# Patient Record
Sex: FEMALE | Race: OTHER
[De-identification: names, ages, dates, MRNs, and addresses within clinical notes are randomized per-mention and may not be internally consistent; named-entity substitution may affect disease eponyms.]

---

## 2019-03-10 ENCOUNTER — HOSPITAL ENCOUNTER (EMERGENCY)
Dept: HOSPITAL 41 - JD.ED | Age: 54
Discharge: HOME | End: 2019-03-10
Payer: COMMERCIAL

## 2019-03-10 VITALS — SYSTOLIC BLOOD PRESSURE: 151 MMHG | DIASTOLIC BLOOD PRESSURE: 96 MMHG

## 2019-03-10 DIAGNOSIS — R51: ICD-10-CM

## 2019-03-10 DIAGNOSIS — Z79.899: ICD-10-CM

## 2019-03-10 DIAGNOSIS — Z87.891: ICD-10-CM

## 2019-03-10 DIAGNOSIS — Z88.5: ICD-10-CM

## 2019-03-10 DIAGNOSIS — R07.9: Primary | ICD-10-CM

## 2019-03-10 DIAGNOSIS — I10: ICD-10-CM

## 2019-03-10 DIAGNOSIS — N39.0: ICD-10-CM

## 2019-03-10 PROCEDURE — 36415 COLL VENOUS BLD VENIPUNCTURE: CPT

## 2019-03-10 PROCEDURE — 84484 ASSAY OF TROPONIN QUANT: CPT

## 2019-03-10 PROCEDURE — 99284 EMERGENCY DEPT VISIT MOD MDM: CPT

## 2019-03-10 PROCEDURE — 81001 URINALYSIS AUTO W/SCOPE: CPT

## 2019-03-10 PROCEDURE — 93005 ELECTROCARDIOGRAM TRACING: CPT

## 2019-03-10 PROCEDURE — 85025 COMPLETE CBC W/AUTO DIFF WBC: CPT

## 2019-03-10 PROCEDURE — 80053 COMPREHEN METABOLIC PANEL: CPT

## 2019-03-10 PROCEDURE — 70450 CT HEAD/BRAIN W/O DYE: CPT

## 2019-03-10 PROCEDURE — 71045 X-RAY EXAM CHEST 1 VIEW: CPT

## 2019-03-10 NOTE — EDM.PDOC
ED HPI GENERAL MEDICAL PROBLEM





- General


Chief Complaint: Cardiovascular Problem


Stated Complaint: HIGH BLOOD PRESSURE


Time Seen by Provider: 03/10/19 12:49


Source of Information: Reports: Patient


History Limitations: Reports: No Limitations





- History of Present Illness


INITIAL COMMENTS - FREE TEXT/NARRATIVE: 





The patient presents with elevated blood pressure.  She said it was 240/150 at 

home.  She has a headache to the back of her head.  She is shaky and jittery.  

She has no double vision or blurred vision.  She has no fever, chills, cough, 

congestion and runny nose.  She does have some chest pain like a pressure.  She 

has no shortness of breath.  She has no nausea, vomiting or abdominal pain.  

She just does not feel right.  She was in [place] at [facility] for a 

consultation with a surgeon for weight loss surgery.  They said her blood 

pressure was high and she should follow up with that.  She has never had 

trouble with BP before.  She feels like she does have a UTI.


Onset: Gradual


Duration: Day(s):


Location: Reports: Head, Chest


Quality: Reports: Pressure


Severity: Moderate


Improves with: Reports: None


Worsens with: Reports: None


Associated Symptoms: Reports: Chest Pain, Headaches.  Denies: Cough, Fever/

Chills, Nausea/Vomiting, Shortness of Breath


  ** Posterior Head


Pain Score (Numeric/FACES): 6





- Related Data


 Allergies











Allergy/AdvReac Type Severity Reaction Status Date / Time


 


tramadol Allergy  Nausea and Verified 03/10/19 12:55





   Vomiting  











Home Meds: 


 Home Meds





Cephalexin [Keflex] 500 mg PO BID #10 capsule 03/10/19 [Rx]


Cyanocobalamin (Vitamin B12) [Vitamin B12] 1,000 mcg PO DAILY 03/10/19 [History]


FA/Lycopene/Lut/MV,Ca,Iron,Min [Centrum] 1 tab PO DAILY 03/10/19 [History]


Ferrous Sulfate [Iron] 325 mg PO DAILY 03/10/19 [History]


hydroCHLOROthiazide [Hydrochlorothiazide] 25 mg PO DAILY #30 tab 03/10/19 [Rx]











Past Medical History


Genitourinary History: Reports: UTI, Recurrent


OB/GYN History: Reports: Ectopic Pregnancy, Pregnancy


Musculoskeletal History: Reports: Back Pain, Chronic, Fracture


Other Musculoskeletal History: elbow & arms


Hematologic History: Reports: Anemia, Iron Deficiency





- Past Surgical History


GI Surgical History: Reports: Appendectomy





Social & Family History





- Tobacco Use


Smoking Status *Q: Former Smoker


Used Tobacco, but Quit: Yes


Month/Year Tobacco Last Used: 10 years





- Caffeine Use


Caffeine Use: Reports: Coffee, Tea





- Recreational Drug Use


Recreational Drug Type: Reports: Methamphetamine, Other (see below)


Other Recreational Drug Type: crack-last time 10 years ago





- Living Situation & Occupation


Occupation: Employed





ED ROS GENERAL





- Review of Systems


Review Of Systems: See Below


Constitutional: Reports: No Symptoms


HEENT: Reports: No Symptoms


Respiratory: Reports: No Symptoms


Cardiovascular: Reports: Chest Pain


Endocrine: Reports: No Symptoms


GI/Abdominal: Reports: No Symptoms


: Reports: Dysuria


Neurological: Reports: Headache





ED EXAM, GENERAL





- Physical Exam


Exam: See Below


Exam Limited By: No Limitations


General Appearance: Alert, No Apparent Distress


Ears: Normal External Exam


Nose: Normal Inspection


Head: Atraumatic, Normocephalic


Neck: Normal Inspection, Supple, Non-Tender


Respiratory/Chest: No Respiratory Distress, Lungs Clear, Normal Breath Sounds


Cardiovascular: Regular Rate, Rhythm, No Edema, No Murmur


GI/Abdominal: Soft, Non-Tender, No Organomegaly, No Mass


Back Exam: Normal Inspection


Extremities: Normal Inspection





EKG INTERPRETATION


EKG Date: 03/10/19


Time: 12:14


Rhythm: Other (sinus tachycardia)


Rate (Beats/Min): 108


Axis: Normal


P-Wave: Present


QRS: Normal


ST-T: Normal


QT: Normal





Course





- Vital Signs


Last Recorded V/S: 


 Last Vital Signs











Temp  98.2 F   03/10/19 12:53


 


Pulse  88   03/10/19 14:02


 


Resp  16   03/10/19 14:02


 


BP  150/95 H  03/10/19 14:02


 


Pulse Ox  97   03/10/19 14:02














- Orders/Labs/Meds


Orders: 


 Active Orders 24 hr











 Category Date Time Status


 


 Cardiac Monitoring [RC] .AS DIRECTED Care  03/10/19 13:08 Active


 


 EKG Documentation Completion [RC] STAT Care  03/10/19 13:09 Active


 


 Peripheral IV Care [RC] .AS DIRECTED Care  03/10/19 13:09 Active


 


 Chest 1V Frontal [CR] Stat Exams  03/10/19 13:09 Taken


 


 Sodium Chloride 0.9% [Saline Flush] Med  03/10/19 13:08 Active





 10 ml FLUSH ASDIRECTED PRN   


 


 Peripheral IV Insertion Adult [OM.PC] Stat Oth  03/10/19 13:08 Ordered








 Medication Orders





Sodium Chloride (Saline Flush)  10 ml FLUSH ASDIRECTED PRN


   PRN Reason: Keep Vein Open


   Last Admin: 03/10/19 13:17  Dose: 10 ml








Labs: 


 Laboratory Tests











  03/10/19 03/10/19 03/10/19 Range/Units





  12:50 13:10 13:10 


 


WBC   9.41   (3.98-10.04)  K/mm3


 


RBC   5.57 H   (3.98-5.22)  M/mm3


 


Hgb   11.9   (11.2-15.7)  gm/L


 


Hct   39.3   (34.1-44.9)  %


 


MCV   70.6 L   (79.4-94.8)  fl


 


MCH   21.4 L   (25.6-32.2)  pg


 


MCHC   30.3 L   (32.2-35.5)  g/dl


 


RDW Std Deviation   44.9   (36.4-46.3)  fL


 


Plt Count   417 H   (182-369)  K/mm3


 


MPV   11.1   (9.4-12.3)  fl


 


Neut % (Auto)   69.2   (34.0-71.1)  %


 


Lymph % (Auto)   20.2   (19.3-51.7)  %


 


Mono % (Auto)   7.9   (4.7-12.5)  %


 


Eos % (Auto)   1.8   (0.7-5.8)  


 


Baso % (Auto)   0.7   (0.1-1.2)  %


 


Neut # (Auto)   6.51 H   (1.56-6.13)  K/mm3


 


Lymph # (Auto)   1.90   (1.18-3.74)  K/mm3


 


Mono # (Auto)   0.74 H   (0.24-0.36)  K/mm3


 


Eos # (Auto)   0.17   (0.04-0.36)  K/mm3


 


Baso # (Auto)   0.07   (0.01-0.08)  K/mm3


 


Manual Slide Review   Abnormal smear   


 


Sodium    141  (136-145)  mEq/L


 


Potassium    3.7  (3.5-5.1)  mEq/L


 


Chloride    105  ()  mEq/L


 


Carbon Dioxide    24  (21-32)  mEq/L


 


Anion Gap    15.7 H  (5-15)  


 


BUN    12  (7-18)  mg/dL


 


Creatinine    0.9  (0.55-1.02)  mg/dL


 


Est Cr Clr Drug Dosing    68.45  mL/min


 


Estimated GFR (MDRD)    > 60  (>60)  mL/min


 


BUN/Creatinine Ratio    13.3 L  (14-18)  


 


Glucose    109 H  ()  mg/dL


 


Calcium    9.0  (8.5-10.1)  mg/dL


 


Total Bilirubin    0.3  (0.2-1.0)  mg/dL


 


AST    20  (15-37)  U/L


 


ALT    32  (14-59)  U/L


 


Alkaline Phosphatase    100  ()  U/L


 


Troponin I    < 0.017  (0.00-0.056)  ng/mL


 


Total Protein    8.7 H  (6.4-8.2)  g/dl


 


Albumin    3.9  (3.4-5.0)  g/dl


 


Globulin    4.8  gm/dL


 


Albumin/Globulin Ratio    0.8 L  (1-2)  


 


Urine Color  Yellow    (Yellow)  


 


Urine Appearance  Clear    (Clear)  


 


Urine pH  6.5    (5.0-8.0)  


 


Ur Specific Gravity  1.010    (1.005-1.030)  


 


Urine Protein  Negative    (Negative)  


 


Urine Glucose (UA)  Negative    (Negative)  


 


Urine Ketones  Negative    (Negative)  


 


Urine Occult Blood  2+ H    (Negative)  


 


Urine Nitrite  Negative    (Negative)  


 


Urine Bilirubin  Negative    (Negative)  


 


Urine Urobilinogen  0.2    (0.2-1.0)  


 


Ur Leukocyte Esterase  1+ H    (Negative)  


 


Urine RBC  10-20 H    (0-5)  /hpf


 


Urine WBC  5-10 H    (0-5)  /hpf


 


Ur Epithelial Cells  Not seen    (0-5)  /hpf


 


Ur Squamous Epith Cells  0-5    (0-5)  /hpf


 


Urine Bacteria  Many H    (FEW)  /hpf


 


Urine Mucus  Few    (FEW)  /hpf











Meds: 


Medications











Generic Name Dose Route Start Last Admin





  Trade Name Freq  PRN Reason Stop Dose Admin


 


Sodium Chloride  10 ml  03/10/19 13:08  03/10/19 13:17





  Saline Flush  FLUSH   10 ml





  ASDIRECTED PRN   Administration





  Keep Vein Open   





     





     





     














Discontinued Medications














Generic Name Dose Route Start Last Admin





  Trade Name Freq  PRN Reason Stop Dose Admin


 


Metoprolol Tartrate  50 mg  03/10/19 13:10  03/10/19 13:17





  Lopressor  PO  03/10/19 13:11  50 mg





  ONETIME ONE   Administration





     





     





     





     














- Re-Assessments/Exams


Free Text/Narrative Re-Assessment/Exam: 





03/10/19 14:47


I ordered an IV saline lock, EKG, CXR, CT of her head, metoprolol, labs and UA.

  Her EKG shows a NSR with no acute changes.  Her CT shows nothing acute.  Her 

CBC and CMP look good.  Her troponin is negative.  Her UA does show a UTI.  Her 

blood pressure is better.  I will need to get her on some HCTZ for blood 

pressure and some keflex 2 times per day for 5 days.





Departure





- Departure


Time of Disposition: 14:50


Disposition: Home, Self-Care 01


Condition: Good


Clinical Impression: 


Hypertension


Qualifiers:


 Hypertension type: essential hypertension Qualified Code(s): I10 - Essential (

primary) hypertension





UTI (urinary tract infection)


Qualifiers:


 Urinary tract infection type: site unspecified Hematuria presence: without 

hematuria Qualified Code(s): N39.0 - Urinary tract infection, site not specified





Headache


Qualifiers:


 Headache type: unspecified Headache chronicity pattern: acute headache 

Intractability: not intractable Qualified Code(s): R51 - Headache





Chest pain


Qualifiers:


 Chest pain type: unspecified Qualified Code(s): R07.9 - Chest pain, unspecified





Prescriptions: 


Cephalexin [Keflex] 500 mg PO BID #10 capsule


hydroCHLOROthiazide [Hydrochlorothiazide] 25 mg PO DAILY #30 tab


Referrals: 


Smiley River NP [Primary Care Provider] - 1 Week


Forms:  ED Department Discharge


Additional Instructions: 


Take the hydrochlorothiazide daily for high blood pressure.  Take the keflex 

500mg 2 times per day for 5 days for the bladder infection.  Follow up with 

your doctor in 1 week.  Please return if you are worse.





- My Orders


Last 24 Hours: 


My Active Orders





03/10/19 13:08


Cardiac Monitoring [RC] .AS DIRECTED 


Sodium Chloride 0.9% [Saline Flush]   10 ml FLUSH ASDIRECTED PRN 


Peripheral IV Insertion Adult [OM.PC] Stat 





03/10/19 13:09


EKG Documentation Completion [RC] STAT 


Peripheral IV Care [RC] .AS DIRECTED 


Chest 1V Frontal [CR] Stat 














- Assessment/Plan


Last 24 Hours: 


My Active Orders





03/10/19 13:08


Cardiac Monitoring [RC] .AS DIRECTED 


Sodium Chloride 0.9% [Saline Flush]   10 ml FLUSH ASDIRECTED PRN 


Peripheral IV Insertion Adult [OM.PC] Stat 





03/10/19 13:09


EKG Documentation Completion [RC] STAT 


Peripheral IV Care [RC] .AS DIRECTED 


Chest 1V Frontal [CR] Stat

## 2019-03-10 NOTE — CT
Head CT

 

Technique: Multiple axial sections through the brain were obtained.  

Intravenous contrast was not utilized.

 

Comparison: No previous intracranial imaging is available.

 

Findings: Ventricles along with basal cisterns and sulci over the 

convexities are within normal limits for the patient's age.  No 

abnormal parenchymal densities are seen.  No evidence of intracranial 

hemorrhage.  No midline shift or mass effect is seen.

 

Bone window settings were reviewed which shows no acute calvarial 

abnormality.

 

Impression:

1.  Nothing acute is appreciated on noncontrast head CT exam.

 

Diagnostic code #1

## 2019-03-11 NOTE — CR
Chest: Frontal view of the chest was obtained.

 

Comparison: No prior chest x-ray.

 

Heart size and mediastinum are within normal limits for technique.  

Lungs are clear with no acute parenchymal change.  Bony structures 

show inferior spurring within both acromioclavicular joints.

 

Impression:

1.  Incidental findings.  Nothing acute is appreciated on frontal 

chest x-ray.

 

Diagnostic code #2